# Patient Record
Sex: FEMALE | Race: WHITE
[De-identification: names, ages, dates, MRNs, and addresses within clinical notes are randomized per-mention and may not be internally consistent; named-entity substitution may affect disease eponyms.]

---

## 2020-08-10 ENCOUNTER — HOSPITAL ENCOUNTER (INPATIENT)
Dept: HOSPITAL 1 - ED | Age: 35
LOS: 2 days | Discharge: HOME | DRG: 64 | End: 2020-08-12
Attending: FAMILY MEDICINE | Admitting: FAMILY MEDICINE
Payer: COMMERCIAL

## 2020-08-10 VITALS
WEIGHT: 174 LBS | HEIGHT: 70 IN | WEIGHT: 174 LBS | BODY MASS INDEX: 24.91 KG/M2 | HEIGHT: 70 IN | BODY MASS INDEX: 24.91 KG/M2

## 2020-08-10 VITALS — SYSTOLIC BLOOD PRESSURE: 156 MMHG | DIASTOLIC BLOOD PRESSURE: 108 MMHG

## 2020-08-10 DIAGNOSIS — E87.6: ICD-10-CM

## 2020-08-10 DIAGNOSIS — Z79.899: ICD-10-CM

## 2020-08-10 DIAGNOSIS — Z83.3: ICD-10-CM

## 2020-08-10 DIAGNOSIS — E78.5: ICD-10-CM

## 2020-08-10 DIAGNOSIS — I63.9: Primary | ICD-10-CM

## 2020-08-10 DIAGNOSIS — Z79.82: ICD-10-CM

## 2020-08-10 DIAGNOSIS — I10: ICD-10-CM

## 2020-08-10 DIAGNOSIS — N17.0: ICD-10-CM

## 2020-08-10 DIAGNOSIS — I16.1: ICD-10-CM

## 2020-08-10 DIAGNOSIS — Z82.49: ICD-10-CM

## 2020-08-10 LAB
ALBUMIN SERPL-MCNC: 3.7 G/DL (ref 3.4–5)
ALP SERPL-CCNC: 75 U/L (ref 46–116)
ALT SERPL-CCNC: 50 U/L (ref 14–59)
AMPHETAMINES UR QL SCN: (no result)
AMYLASE SERPL-CCNC: 45 U/L (ref 25–115)
AST SERPL-CCNC: 26 U/L (ref 15–37)
BASOPHILS NFR BLD: 0.5 % (ref 0–2)
BILIRUB SERPL-MCNC: 0.7 MG/DL (ref 0.2–1)
BUN SERPL-MCNC: 13 MG/DL (ref 7–18)
CALCIUM SERPL-MCNC: 8.9 MG/DL (ref 8.5–10.1)
CHLORIDE SERPL-SCNC: 103 MMOL/L (ref 98–107)
CHOLEST SERPL-MCNC: 182 MG/DL (ref ?–200)
CHOLEST/HDLC SERPL: 3.2 MG/DL
CO2 SERPL-SCNC: 31.7 MMOL/L (ref 21–32)
CREAT SERPL-MCNC: 1 MG/DL (ref 0.6–1)
ERYTHROCYTE [DISTWIDTH] IN BLOOD BY AUTOMATED COUNT: 15.9 % (ref 11.5–14.5)
GFR SERPLBLD BASED ON 1.73 SQ M-ARVRAT: > 60 ML/MIN
GLUCOSE SERPL-MCNC: 94 MG/DL (ref 74–106)
HDLC SERPL-MCNC: 57 MG/DL (ref 40–60)
LIPASE SERPL-CCNC: 81 IU/L (ref 73–393)
MICROSCOPIC UR-IMP: YES
PLATELET # BLD: 307 X10^3MCL (ref 130–400)
POTASSIUM SERPL-SCNC: 3.2 MMOL/L (ref 3.5–5.1)
PROT SERPL-MCNC: 8.2 G/DL (ref 6.4–8.2)
RBC # UR STRIP.AUTO: NEGATIVE /UL
SODIUM SERPL-SCNC: 142 MMOL/L (ref 136–145)
TRIGL SERPL-MCNC: 88 MG/DL (ref ?–150)
UA SPECIFIC GRAVITY: 1.02 (ref 1–1.03)

## 2020-08-10 PROCEDURE — G0378 HOSPITAL OBSERVATION PER HR: HCPCS

## 2020-08-10 NOTE — NUR
ASSUMING CARE OF PT AT THIS TIME. PT AWAKE AND ALERT. RESP EVEN AND UNLABORED,
PT DENIES ANY PAIN AT THIS TIME. VITALS UPDATED. WAITING FOR TELE ROOM. WILL
CONT TO MONITOR.

## 2020-08-10 NOTE — NUR
CALLED AND GAVE REPORT TO BHUPENDRA MENA TO ASSUME CARE OF PT AT THIS TIME. SHE
STATES PLEASE WAIT 10 MINS TO BRING UP PT ROOM STILL BEING CLEANED.

## 2020-08-10 NOTE — NUR
RECEIVED PT FROM ER, PT ADMIT FOR CVA, PT IS A/O X4, VERBAL RESPONSIVE. NO
FACIAL DROOP NOTED. NO WEAKNESS SIDE. C/O HEADACHE 4/10, LUNG SOUND CLEAR
BILATERAL, NO COUGH, NO SOB. PT IS ON TELE 31, NSR, DENY ANY CHEST PAIN OR
DISCOMFORT, BOWEL SOUND PRESENT ALL 4 QUADRANTS, NO DISTENTION, NO TENDER.
PEDAL PULSE PRESENT BOTH FEET, NO EDEMA, IV AT LEFT AC, NO LEAKING, NO
INFITRATION. ALL ADLS ASSIST, ALL NEED MET, CALL LIGHT IN REACH, WILL CONTINUE
TO MONITOR.

## 2020-08-11 VITALS — DIASTOLIC BLOOD PRESSURE: 151 MMHG | SYSTOLIC BLOOD PRESSURE: 231 MMHG

## 2020-08-11 VITALS — SYSTOLIC BLOOD PRESSURE: 185 MMHG | DIASTOLIC BLOOD PRESSURE: 112 MMHG

## 2020-08-11 VITALS — SYSTOLIC BLOOD PRESSURE: 191 MMHG | DIASTOLIC BLOOD PRESSURE: 120 MMHG

## 2020-08-11 VITALS — DIASTOLIC BLOOD PRESSURE: 121 MMHG | SYSTOLIC BLOOD PRESSURE: 206 MMHG

## 2020-08-11 VITALS — SYSTOLIC BLOOD PRESSURE: 218 MMHG | DIASTOLIC BLOOD PRESSURE: 137 MMHG

## 2020-08-11 LAB
BASOPHILS NFR BLD: 0.5 % (ref 0–2)
BUN SERPL-MCNC: 13 MG/DL (ref 7–18)
CALCIUM SERPL-MCNC: 8.7 MG/DL (ref 8.5–10.1)
CHLORIDE SERPL-SCNC: 104 MMOL/L (ref 98–107)
CO2 SERPL-SCNC: 30.5 MMOL/L (ref 21–32)
CREAT SERPL-MCNC: 0.8 MG/DL (ref 0.6–1)
ERYTHROCYTE [DISTWIDTH] IN BLOOD BY AUTOMATED COUNT: 16.6 % (ref 11.5–14.5)
GFR SERPLBLD BASED ON 1.73 SQ M-ARVRAT: > 60 ML/MIN
GLUCOSE SERPL-MCNC: 93 MG/DL (ref 74–106)
MAGNESIUM SERPL-MCNC: 2.1 MG/DL (ref 1.8–2.4)
PHOSPHATE SERPL-MCNC: 4 MG/DL (ref 2.5–4.9)
PLATELET # BLD: 276 X10^3MCL (ref 130–400)
POTASSIUM SERPL-SCNC: 3.4 MMOL/L (ref 3.5–5.1)
SODIUM SERPL-SCNC: 142 MMOL/L (ref 136–145)

## 2020-08-11 NOTE — NUR
PATIENTS BP REMAINED ELEVATED, /137 . DR. WONG AWARE AND WANTS
TO MAINTAIN PATIENT IN PREMISSIVE HTN. PATIENT DENIES HEADACHE AT THIS TIME.
NO NEW NEURO DEFICIT NOTED AT THIS TIME. WILL CONTINUE TO MONITOR.

## 2020-08-11 NOTE — NUR
NOTIFIED DR. HANCOCK REGARDING PATIENTS ELEVATED BP, DR. LEE MADE AWARE
1ST BP /121  AND 2ND BP /116 . DR. HANCOCK STATED
SHE WILL TEXT DR. WONG.

## 2020-08-11 NOTE — NUR
DR. MULLIGAN AWARE OF PATIENTS BP; PER DR. ESE WYATT TO  HOLD OFF HYDRALAZINE.
WILL CONTINUE TO MONITOR.

## 2020-08-11 NOTE — NUR
PT.'S BLOOD PRESSURE CONTINUED TO INCREASE, 204.114, . RESIDENT MADE
AWARE. PER DR. OLSEN, WHO WAS  PRESENT WITH DR. PHILLIP, PT. HAS TO BE
TREATED AND ALLOWED PERMISSIVE HTN TO EXIST FOR UP TO 24HRS. NO MEDICATION
ORDERED, BUT PARAMETERS FOR SBP NOW FOR HYDRALYZINE, TO BE GIEN FOR SBP
GREATER THAN 200. CHARGE NURSE MADE AWARE.

## 2020-08-11 NOTE — NUR
PT. ASLEEP AT THIS TIME. EYES CLOSED. APPEARS COMFORTABLE. NSR ON MONITOR. NO
C/O CHESTPAIN OR DISCOMFORT. NO INCREASE IN WEAKNESS OR NEW NEURO DEFICITS
THUS FAR. WILL CONTINUE TO MONITOR.

## 2020-08-11 NOTE — NUR
PATIENT BP /121 (150), RECHECKED PATIENT BP: 208/116 (142). NO NEW
NEURO DEFICITS NOTED AND WEAKNESS NOTED. PATIENTS REPORTS A HEADACHE 04/10,
PATIENT HAS ASPIRIN DUE AT 0900 WILL GIVE AND CONTINUE TO MONITOR. DR. MARVIN DAMICO AT THIS TIME, WILL FOLLOW UP.

## 2020-08-11 NOTE — NUR
PATIENT BLOOD PRESSURE DECREASED /120 , AFTER HYDRALAZINE 10MG
IVP GIVEN. NO ACUTE CHANGES NOTED AT THIS TIME. PATIENT DENIES HEADACHE. ALL
NEEDS MET AT THIS TIME, WILL CONTINUE TO MONIOR.

## 2020-08-11 NOTE — NUR
RECEIVED PATIENT RESTING IN BED, NO ACUTE DISTRESS NOTED.  PATIENT AAOX4,
DENIES HEADACHE. TELE MONITOR IN PLACE, NSR NOTED. PULSES PALPABLE X4, NO
EDEMA NOTED. LUNG SOUNDS CTA, DENIES SOB. NO NEW NEURO DEFICITS OR WEAKNESS
NOTED. DENIES PAIN AT THIS TIME. IV TO LAC SALINE LOCK, CDI&PATENT. CALL LIGHT
WITHIN REACH, BED IN LOW POSITION, WILL CONTINUE TO MONITOR.

## 2020-08-11 NOTE — NUR
BLOOD PRESSURE ELEVATED THIS MORNING, 185/112, /112. PT. DENIED PAIN,
DENIED HEADACHE. NO BLURRED VISION. NO NEW NEURO DEFICIT OR WEAKNESS NOTED.
PRN HYDRALYZINE, IVP, GIVEN ORDERED. BP REPEATED, BLOOD PRESSURE 198/118.
RESIDENT, DR. PHILLIP, MADE AWARE. WILL CONTINUE TO MONITOR PT.

## 2020-08-11 NOTE — NUR
RECEIVED REPORT FROM TRINA JO. PT RESTING IN BED. AA&O X4. NO SOB ON ROOM
AIR. BREATHING EVEN AND UNALBORED. NO C/O CHEST PAIN. DENIES WEAKNESS AT THIS
TIME. TYLENOL WAS GIVEN BY PREVIOUS RN FOR HEADACHE AND HYDRALAZINE FOR HIGH
BP. WILL REASSESS. SAFETY MEASURES IN PLACE. BED IN LOWEST POSITION.
INSTRUCTED PT TO USE THE CALL LIGHT FOR ASSISTANCE. CALL LIGHT WITHIN REACH.

## 2020-08-12 VITALS — DIASTOLIC BLOOD PRESSURE: 108 MMHG | SYSTOLIC BLOOD PRESSURE: 165 MMHG

## 2020-08-12 VITALS — DIASTOLIC BLOOD PRESSURE: 97 MMHG | SYSTOLIC BLOOD PRESSURE: 151 MMHG

## 2020-08-12 VITALS — DIASTOLIC BLOOD PRESSURE: 110 MMHG | SYSTOLIC BLOOD PRESSURE: 188 MMHG

## 2020-08-12 VITALS — DIASTOLIC BLOOD PRESSURE: 136 MMHG | SYSTOLIC BLOOD PRESSURE: 200 MMHG

## 2020-08-12 LAB
BASOPHILS NFR BLD: 0.3 % (ref 0–2)
BUN SERPL-MCNC: 14 MG/DL (ref 7–18)
CALCIUM SERPL-MCNC: 9.1 MG/DL (ref 8.5–10.1)
CHLORIDE SERPL-SCNC: 104 MMOL/L (ref 98–107)
CO2 SERPL-SCNC: 29.6 MMOL/L (ref 21–32)
CREAT SERPL-MCNC: 1 MG/DL (ref 0.6–1)
ERYTHROCYTE [DISTWIDTH] IN BLOOD BY AUTOMATED COUNT: 16.6 % (ref 11.5–14.5)
GFR SERPLBLD BASED ON 1.73 SQ M-ARVRAT: > 60 ML/MIN
GLUCOSE SERPL-MCNC: 100 MG/DL (ref 74–106)
MAGNESIUM SERPL-MCNC: 2.1 MG/DL (ref 1.8–2.4)
PHOSPHATE SERPL-MCNC: 4.4 MG/DL (ref 2.5–4.9)
PLATELET # BLD: 389 X10^3MCL (ref 130–400)
POTASSIUM SERPL-SCNC: 4.3 MMOL/L (ref 3.5–5.1)
SODIUM SERPL-SCNC: 141 MMOL/L (ref 136–145)

## 2020-08-12 NOTE — NUR
BP: 200/136. PRN HYDRALAZINE IVP ADMINISTERED AS ORDERED PER EMAR. PT DENIES
HEADACHE, CHEST PAIN OR SOB. WILL CONTINUE TO MONITOR.

## 2020-08-12 NOTE — NUR
PT RESTING IN BED, AOX4, RESP E/U ON RA. DENIES HEADACHE, DIZZINESS OR
FATIGUE. NO ACUTE DISTRESS NOTED. BED IN LOWEST POSITION AND CALL LIGHT WITHIN
REACH. WILL CONTINUE TO MONITOR.

## 2020-08-12 NOTE — NUR
PT DISCHARGED. D/C PACKET, NEW RX MEDS AND F/U INSTRUCTIONS REVIEWED W/ PT, PT
VERBALIZED UNDERSTANDING OF INSTRUCTIONS. PT AOX4, RESP E/U ON RA, VS STABLE,
DENIES PAIN AT THIS TIME. IV TO LAC REMOVED, CATH INTACT, GAUZE APPLIED TO
SITE. PT AMBULATORY TO D/C OFFICE, ESCORTED BY TRINA CROCKETT W/ NO ACUTE
INCIDENCE.

## 2020-08-12 NOTE — NUR
PT RESTING COMFORTABLY. NO SOB ON ROOM AIR. NO C/O CHEST PAIN. NO DISTRESS
NOTED. DENIES HEADACHE OR WEAKNESS TO LLE. SAFETY MEASURES MAINTAINED. CALL
LIGHT WITHIN REACH. ENDORSED ACRE TO DAY SHIFT RN.

## 2020-08-12 NOTE — NUR
RECEIVED PT FROM NIGHT SHIFT NURSE. PT IN BED SLEEPING, AROUSABLE, RESP E/U ON
RA. NO ACUTE DISTRESS NOTED. ON TELE 31 SHOW[address] ST, HR: 113. SALINE LOCK TO
LAC W/ NO ERYTHEMA/EDEMA. BED IN LOWEST POSITION AND CALL LIGHT WITHIN REACH.
WILL CONTINUE TO MONITOR.

## 2020-10-07 ENCOUNTER — HOSPITAL ENCOUNTER (INPATIENT)
Dept: HOSPITAL 1 - ED | Age: 35
LOS: 7 days | Discharge: HOME | DRG: 853 | End: 2020-10-14
Attending: HOSPITALIST | Admitting: HOSPITALIST
Payer: COMMERCIAL

## 2020-10-07 VITALS
BODY MASS INDEX: 40.48 KG/M2 | HEIGHT: 68 IN | BODY MASS INDEX: 40.48 KG/M2 | HEIGHT: 68 IN | WEIGHT: 267.12 LBS | WEIGHT: 267.12 LBS

## 2020-10-07 VITALS — DIASTOLIC BLOOD PRESSURE: 78 MMHG | SYSTOLIC BLOOD PRESSURE: 124 MMHG

## 2020-10-07 VITALS — SYSTOLIC BLOOD PRESSURE: 144 MMHG | DIASTOLIC BLOOD PRESSURE: 74 MMHG

## 2020-10-07 DIAGNOSIS — A41.9: Primary | ICD-10-CM

## 2020-10-07 DIAGNOSIS — E86.0: ICD-10-CM

## 2020-10-07 DIAGNOSIS — E66.01: ICD-10-CM

## 2020-10-07 DIAGNOSIS — Z83.3: ICD-10-CM

## 2020-10-07 DIAGNOSIS — K80.42: ICD-10-CM

## 2020-10-07 DIAGNOSIS — N17.0: ICD-10-CM

## 2020-10-07 DIAGNOSIS — Z86.73: ICD-10-CM

## 2020-10-07 DIAGNOSIS — Z20.828: ICD-10-CM

## 2020-10-07 DIAGNOSIS — R10.9: ICD-10-CM

## 2020-10-07 DIAGNOSIS — Z79.82: ICD-10-CM

## 2020-10-07 DIAGNOSIS — Z79.899: ICD-10-CM

## 2020-10-07 DIAGNOSIS — Z79.891: ICD-10-CM

## 2020-10-07 DIAGNOSIS — Z82.49: ICD-10-CM

## 2020-10-07 DIAGNOSIS — I10: ICD-10-CM

## 2020-10-07 LAB
ALBUMIN SERPL-MCNC: 3.7 G/DL (ref 3.4–5)
ALP SERPL-CCNC: 105 U/L (ref 46–116)
ALT SERPL-CCNC: 118 U/L (ref 14–59)
AMPHETAMINES UR QL SCN: (no result)
AST SERPL-CCNC: 41 U/L (ref 15–37)
BASOPHILS NFR BLD: 1.3 % (ref 0–2)
BILIRUB SERPL-MCNC: 1.2 MG/DL (ref 0.2–1)
BUN SERPL-MCNC: 25 MG/DL (ref 7–18)
CALCIUM SERPL-MCNC: 9.3 MG/DL (ref 8.5–10.1)
CHLORIDE SERPL-SCNC: 99 MMOL/L (ref 98–107)
CHOLEST SERPL-MCNC: 160 MG/DL (ref ?–200)
CHOLEST/HDLC SERPL: 2.6 MG/DL
CO2 SERPL-SCNC: 27.3 MMOL/L (ref 21–32)
CREAT SERPL-MCNC: 1.3 MG/DL (ref 0.6–1)
ERYTHROCYTE [DISTWIDTH] IN BLOOD BY AUTOMATED COUNT: 17.1 % (ref 11.5–14.5)
GFR SERPLBLD BASED ON 1.73 SQ M-ARVRAT: 50 ML/MIN
GLUCOSE SERPL-MCNC: 80 MG/DL (ref 74–106)
HDLC SERPL-MCNC: 61 MG/DL (ref 40–60)
LIPASE SERPL-CCNC: 84 IU/L (ref 73–393)
MAGNESIUM SERPL-MCNC: 2.2 MG/DL (ref 1.8–2.4)
MICROSCOPIC UR-IMP: YES
PHOSPHATE SERPL-MCNC: 3.5 MG/DL (ref 2.5–4.9)
PLATELET # BLD: 334 X10^3MCL (ref 130–400)
POTASSIUM SERPL-SCNC: 3.5 MMOL/L (ref 3.5–5.1)
PROT SERPL-MCNC: 9 G/DL (ref 6.4–8.2)
RBC # UR STRIP.AUTO: NEGATIVE /UL
SODIUM SERPL-SCNC: 135 MMOL/L (ref 136–145)
TRIGL SERPL-MCNC: 118 MG/DL (ref ?–150)
UA SPECIFIC GRAVITY: 1.02 (ref 1–1.03)

## 2020-10-07 PROCEDURE — G0378 HOSPITAL OBSERVATION PER HR: HCPCS

## 2020-10-07 PROCEDURE — A9537 TC99M MEBROFENIN: HCPCS

## 2020-10-07 NOTE — NUR
ER RESIDENT AT BEDSIDE TO SEE PT. PT ARRIVES WITH EPIGASTRIC PAINS SINCE 
SUNDAY. NO GUARDING OBSERVED AT THIS TIME

## 2020-10-07 NOTE — NUR
RECEIVED PT FROM DAY SHIFT NURSE. PT A.OX4. ABLE TO MAKE NEEDS KNOWN. SPEECH
CLEAR. MED SURG. PULSES PALPABLE. NO EDEMA PRESENT. RR EVEN AND UNLABOED ON
RA. PT DENIES SOB OR DIFFICULTY BREATHING. PT VOIDS FREELY. AMBULATORY. NO C/O
PAIN OR DISCOMFORT. IV TO RFA INFUSING NS AT 100ML/HR. CALL LIGHT WITHIN
REACH. BED IN LOWEST POSITION. WILL CONTINUE TO MONITOR.

## 2020-10-07 NOTE — NUR
RECEIVED PATIENT FROM ER VIA WHEELCHAIR, AA/O X4, NO DISTRESS NOTED. C/O 6/10
EPIGASTRIC PAIN. MED-SURG, HR 73. UPDATE POC AND ANSWER ALL QUESTION. KEEP NPO
X MEDS. CALL LIGHT WITHIN. CARE ENDORSE TO RADHA MENA.

## 2020-10-08 VITALS — DIASTOLIC BLOOD PRESSURE: 87 MMHG | SYSTOLIC BLOOD PRESSURE: 156 MMHG

## 2020-10-08 VITALS — DIASTOLIC BLOOD PRESSURE: 70 MMHG | SYSTOLIC BLOOD PRESSURE: 136 MMHG

## 2020-10-08 VITALS — DIASTOLIC BLOOD PRESSURE: 74 MMHG | SYSTOLIC BLOOD PRESSURE: 137 MMHG

## 2020-10-08 LAB
ALBUMIN SERPL-MCNC: 2.8 G/DL (ref 3.4–5)
ALP SERPL-CCNC: 73 U/L (ref 46–116)
ALT SERPL-CCNC: 73 U/L (ref 14–59)
AST SERPL-CCNC: 26 U/L (ref 15–37)
BASOPHILS NFR BLD: 0.3 % (ref 0–2)
BILIRUB SERPL-MCNC: 1.1 MG/DL (ref 0.2–1)
BUN SERPL-MCNC: 31 MG/DL (ref 7–18)
CALCIUM SERPL-MCNC: 8.4 MG/DL (ref 8.5–10.1)
CHLORIDE SERPL-SCNC: 105 MMOL/L (ref 98–107)
CO2 SERPL-SCNC: 23.2 MMOL/L (ref 21–32)
CREAT SERPL-MCNC: 1.5 MG/DL (ref 0.6–1)
ERYTHROCYTE [DISTWIDTH] IN BLOOD BY AUTOMATED COUNT: 17.2 % (ref 11.5–14.5)
GFR SERPLBLD BASED ON 1.73 SQ M-ARVRAT: 43 ML/MIN
GLUCOSE SERPL-MCNC: 72 MG/DL (ref 74–106)
MAGNESIUM SERPL-MCNC: 2.1 MG/DL (ref 1.8–2.4)
PHOSPHATE SERPL-MCNC: 4.2 MG/DL (ref 2.5–4.9)
PLATELET # BLD: 271 X10^3MCL (ref 130–400)
POTASSIUM SERPL-SCNC: 3.9 MMOL/L (ref 3.5–5.1)
PROT SERPL-MCNC: 7 G/DL (ref 6.4–8.2)
SODIUM SERPL-SCNC: 138 MMOL/L (ref 136–145)

## 2020-10-08 NOTE — NUR
ROUNDING WITH OUTGOING NIGHT SHIFT RN , LONNIEA SCAN TECH HERE AT BEDSIDE AND RN
CONVERTED IV TO SALINE LOCK AND WHEELCHAIR PT TO HIDA SCAN DEPT .

## 2020-10-08 NOTE — NUR
PT SLEEPING AT THIS TIME. NO SIGNS OF ACUTE DISTRESS NOTED. RR EVEN AND
UNLABORED ON RA. NO C/O OF PAIN OR DISCOMFORT. WILL CONTINUE TO MONITOR.

## 2020-10-08 NOTE — NUR
MD PHILLIP CALLED AND INFORMED ABOUT HIDA SCAN RESULTS ACUTE CHLECYSTITIS , NO
OBSTRUCTION OF THE CYSTIC OR COMMON BILE DUCT AND PT MADE AWARE AND
ACKNOWLEDGED .

## 2020-10-08 NOTE — NUR
RECEIVED PT FROM DAY SHIFT NURSE. PT AWAKE RESTING IN BED AT THIS TIME. A/OX4.
ABLE TO MAKE NEEDS KNOWN. MED SURG. RR EVEN AND UNLABORED ON RA. VOIDS FREELY.
AMBULATORY. PT DENIES PAIN AT THIS TIME. IV TO RFA INFUSING NS AT 100ML/HR.
CALL LIGHT WITHIN REACH. BED IN LOWEST POSITION. WILL CONTINUE TO MONITOR.

## 2020-10-08 NOTE — NUR
PT SLEEPING AT THIS TIME. EASILY AROUSABLE. NO SIGNS OF ACUTE DISTRESS NOTED.
NO C/O PAIN OR DISCOMFORT AT THIS TIME. ALL NEEDS/CONCERNS ADDRESSED
THROUGHOUT THE SHIFT. WILL ENDORSE CARE TO ONCOMING SHIFT NURSE.

## 2020-10-08 NOTE — NUR
NEW IV STARTED VIA LEFT FOREARM ANGIO # 22 AND ANTIBIOTIC ZOSYN IVPB
ADMINISTERED , FORMER IV DISCONTINUED ,

## 2020-10-09 VITALS — DIASTOLIC BLOOD PRESSURE: 83 MMHG | SYSTOLIC BLOOD PRESSURE: 167 MMHG

## 2020-10-09 VITALS — DIASTOLIC BLOOD PRESSURE: 95 MMHG | SYSTOLIC BLOOD PRESSURE: 146 MMHG

## 2020-10-09 VITALS — SYSTOLIC BLOOD PRESSURE: 141 MMHG | DIASTOLIC BLOOD PRESSURE: 86 MMHG

## 2020-10-09 VITALS — DIASTOLIC BLOOD PRESSURE: 98 MMHG | SYSTOLIC BLOOD PRESSURE: 144 MMHG

## 2020-10-09 VITALS — SYSTOLIC BLOOD PRESSURE: 148 MMHG | DIASTOLIC BLOOD PRESSURE: 95 MMHG

## 2020-10-09 VITALS — DIASTOLIC BLOOD PRESSURE: 30 MMHG | SYSTOLIC BLOOD PRESSURE: 120 MMHG

## 2020-10-09 LAB
BASOPHILS NFR BLD: 0.5 % (ref 0–2)
BUN SERPL-MCNC: 19 MG/DL (ref 7–18)
CALCIUM SERPL-MCNC: 8.7 MG/DL (ref 8.5–10.1)
CHLORIDE SERPL-SCNC: 105 MMOL/L (ref 98–107)
CO2 SERPL-SCNC: 24.4 MMOL/L (ref 21–32)
CREAT SERPL-MCNC: 1 MG/DL (ref 0.6–1)
ERYTHROCYTE [DISTWIDTH] IN BLOOD BY AUTOMATED COUNT: 17.6 % (ref 11.5–14.5)
GFR SERPLBLD BASED ON 1.73 SQ M-ARVRAT: > 60 ML/MIN
GLUCOSE SERPL-MCNC: 72 MG/DL (ref 74–106)
MAGNESIUM SERPL-MCNC: 1.9 MG/DL (ref 1.8–2.4)
PHOSPHATE SERPL-MCNC: 3.1 MG/DL (ref 2.5–4.9)
PLATELET # BLD: 274 X10^3MCL (ref 130–400)
POTASSIUM SERPL-SCNC: 4 MMOL/L (ref 3.5–5.1)
SODIUM SERPL-SCNC: 139 MMOL/L (ref 136–145)

## 2020-10-09 NOTE — NUR
PATIENT RESTING IN BED, NO APPARENT DISTRESS. NO ACUTE CHANGES NOTED
THROUGHOUT SHIFT. IV SITE INFUSING WELL, CDI AND PATENT. WILL ENDORSE TO
ONCOMING NIGHT NURSE.

## 2020-10-09 NOTE — NUR
PT AWAKE RESTING IN BED AT THIS TIME. NO SIGNS OF ACUTE DISTRESS NOTED. RR
EVEN AND UNLABORED ON RA. PT DENIES PAIN AT THIS TIME. ALL NEEDS/CONCERNS
ADDRESSED THROUGHOUT THE SHIFT. WILL ENDORSE CARE TO ONCOMING SHIFT NURSE

## 2020-10-09 NOTE — NUR
*************************NURSING CO-SIGN*************************
THE DOCUMENTATION ENTERED BY THE IP HAS BEEN REVIEWED.
 
REVIEWED/CO-SIGNED BY: Chantale Strauss RN
DOCUMENTATION DONE BY: GUERRERO BLANCAS RN

## 2020-10-09 NOTE — NUR
PT LYING IN BED SUPINE WATCHING TV ON CELL PHONE.  A/OX4, CALM AND
COOPERATIVE.  RESPIRATIONS EVEN, UNLABORED, CTA, RA.  DENIES SOB, DIZZINESS,
CHEST PAIN.  BS ACTIVE.  ABD FLAT/SOFT.  TENDER TO PALPATION.  LBM 10/09,
LOOSE.  DENIES URINARY ISSUES.  PERIPHERAL PULSES STRONG.  NO EDEMA NOTED.
DENIES PAIN.  BED IN LOWEST POSITION, SIDE RAILS X 2, CALL LIGHT WITHIN REACH

## 2020-10-10 VITALS — DIASTOLIC BLOOD PRESSURE: 89 MMHG | SYSTOLIC BLOOD PRESSURE: 154 MMHG

## 2020-10-10 VITALS — SYSTOLIC BLOOD PRESSURE: 126 MMHG | DIASTOLIC BLOOD PRESSURE: 86 MMHG

## 2020-10-10 VITALS — SYSTOLIC BLOOD PRESSURE: 147 MMHG | DIASTOLIC BLOOD PRESSURE: 81 MMHG

## 2020-10-10 VITALS — SYSTOLIC BLOOD PRESSURE: 134 MMHG | DIASTOLIC BLOOD PRESSURE: 85 MMHG

## 2020-10-10 VITALS — SYSTOLIC BLOOD PRESSURE: 145 MMHG | DIASTOLIC BLOOD PRESSURE: 94 MMHG

## 2020-10-10 NOTE — NUR
PT SLEEPING SUPINE.  RESPIRATIONS UNLABORED.  NON VERBAL PAIN INDICATORS
ABSENT. NO C/O PAIN, N/V, DIARRHEA DURING SHIFT.  PT ROVIDED WITH SUPPLIES TO
CLEAN SELF SINCE NO SHOWER AVAILABLE.  IV LFA PATENT RUNNING NS@100ML/HR, NO
COMPLICATIONS TO SITE.  NO FALLS OR INJURIES SUSTAINED DURING SHFIT.  BED IN
LOWEST POSITION, SIDE RAILS X 2, CALL LIGHT WITHIN REACH

## 2020-10-10 NOTE — NUR
pt sleeping, easily woken to voice. medicated as ordered. tolerated well.
updated by rn. [email or web address]. respiratons unlabored. skin warm and dry. denies any
complaints or concerns at this time.

## 2020-10-10 NOTE — NUR
PT C/O PAIN ON THE LFA IV SITE, DC'D, MINIMAL BLEEDING, CATHETER INTACT.
INSERTED A NEW IV ON THE LEFT HAND WITH GOOD BLOOD RETURN, FLUSHING WELL, PT
TOLERATED WELL.

## 2020-10-10 NOTE — NUR
MEDICATED AS ORDERED. TOLERATED WELL. SITE CLEAR. a&OX4. RESPIRATIONS
UNLABORED. SKIN WARM AND DRY. WATCHING TV RESTING QUIETLY IN BED.

## 2020-10-10 NOTE — NUR
PT LYING IN BED SUPINE WATCHING TV ON PHONE.  RESPIRATIONS UNLABORED.  DENIES
PAIN, N/V. IV LFA PATENT, RUNNING NS@100 ML/HR, NO COMPLICATIOS TO SITE.  ALL
NEEDS MET.  BED IN LOWEST POSTION, SIDE RAILS X 2, CALL LIGHT WITHIN REACH

## 2020-10-10 NOTE — NUR
RECEIVED PT IN BED, AWAKE, RESTING COMFORTABLY. A/O X 3, ABLE TO FOLLOW
COMMANDS, ABLE TO MAKE NEEDS KNOWN, SPEECH IS CLR, NO C/O HA OR DIZZINESS.
RESP IS EVEN AND UNLABORED, LUNGS CLR BILATERALLY, SPO2 98% ON RA. RADIAL AND
PEDAL PULSES PRESENT, NO EDEMA NOTED. NO C/O CHEST PAIN AT THIS TIME. ABD
ROUND AND SOFT, REPORTS SLIGHT TENDERNESS ON THE RUQ, NO C/O NV, REPORTS LAST
BM 10/10, BROWN WATERY WITH CHUNKS. PT VOIDS FREELY, NO DISCOMFORT REPORTED.
IV SITE ON THE LFA UIS PATENT AND FLUSHING BUT PT C/O PAIN. WILL ATTEMPT TO
REINSERT TO ANOTHER LOCATION. BED TO LOWEST POSTITION, CALL LIGHT WITHIN
REACH. WILL CONT TO MONITOR FOR CHANGES IN CONDITION.

## 2020-10-11 VITALS — SYSTOLIC BLOOD PRESSURE: 138 MMHG | DIASTOLIC BLOOD PRESSURE: 94 MMHG

## 2020-10-11 VITALS — DIASTOLIC BLOOD PRESSURE: 75 MMHG | SYSTOLIC BLOOD PRESSURE: 135 MMHG

## 2020-10-11 VITALS — DIASTOLIC BLOOD PRESSURE: 91 MMHG | SYSTOLIC BLOOD PRESSURE: 142 MMHG

## 2020-10-11 VITALS — DIASTOLIC BLOOD PRESSURE: 82 MMHG | SYSTOLIC BLOOD PRESSURE: 147 MMHG

## 2020-10-11 VITALS — DIASTOLIC BLOOD PRESSURE: 76 MMHG | SYSTOLIC BLOOD PRESSURE: 128 MMHG

## 2020-10-11 NOTE — NUR
PT A/A/O X4. DENIES DIZZINESS AND HEADACHE. BREATH SOUNDS CLEAR. BREATHING
EVEN AND UNLABORED ON ROOM AIR. DENIES CHEST PAIN AND PRESSURE. BOWEL SOUNDS
ACTIVE. NO C/O N/V AND ABD PAIN THUS FAR. IV INTACT ON THE LEFT HAND INFUSING
WITH NS  ML/HR. MADE PT COMFORTABLE. PLACED CALL LIGHT WITH IN REACH.
WILL CONTINUE TO MONITOR.

## 2020-10-11 NOTE — NUR
PT AWAKE RESTING COMFORTABLY WITH EYES CLOSED, NO C/O PAIN, NO ACUTE DISTRESS
NOTED. RESP IS EVEN AND UNLABORED. WILL CONT TO MONITOR FOR CHANGES IN
CONDITION AND ENDORSE TO NEXT SHIFT NURSE.

## 2020-10-11 NOTE — NUR
PT IN BED, RESTING COMFORTABLY WITH EYES CLOSED. NO C/O PAIN, NO ACUTE
DISTRESS NOTED. RESP IS EVEN AND UNLABORED.BED TO LOWEST POSITION, CALL LIGHT
WITHIN REASY REACH, WILL CONT TO MONIOTR FOR CHANGES IN POSITION.

## 2020-10-12 VITALS — DIASTOLIC BLOOD PRESSURE: 83 MMHG | SYSTOLIC BLOOD PRESSURE: 129 MMHG

## 2020-10-12 VITALS — DIASTOLIC BLOOD PRESSURE: 115 MMHG | SYSTOLIC BLOOD PRESSURE: 178 MMHG

## 2020-10-12 VITALS — SYSTOLIC BLOOD PRESSURE: 146 MMHG | DIASTOLIC BLOOD PRESSURE: 91 MMHG

## 2020-10-12 VITALS — SYSTOLIC BLOOD PRESSURE: 164 MMHG | DIASTOLIC BLOOD PRESSURE: 99 MMHG

## 2020-10-12 LAB
BASOPHILS NFR BLD: 0 % (ref 0–2)
BUN SERPL-MCNC: 10 MG/DL (ref 7–18)
CALCIUM SERPL-MCNC: 9.4 MG/DL (ref 8.5–10.1)
CHLORIDE SERPL-SCNC: 102 MMOL/L (ref 98–107)
CO2 SERPL-SCNC: 27.9 MMOL/L (ref 21–32)
CREAT SERPL-MCNC: 1 MG/DL (ref 0.6–1)
ERYTHROCYTE [DISTWIDTH] IN BLOOD BY AUTOMATED COUNT: 16.7 % (ref 11.5–14.5)
GFR SERPLBLD BASED ON 1.73 SQ M-ARVRAT: > 60 ML/MIN
GLUCOSE SERPL-MCNC: 116 MG/DL (ref 74–106)
PLATELET # BLD: 314 X10^3MCL (ref 130–400)
POTASSIUM SERPL-SCNC: 3.9 MMOL/L (ref 3.5–5.1)
SODIUM SERPL-SCNC: 139 MMOL/L (ref 136–145)

## 2020-10-12 PROCEDURE — 0FT44ZZ RESECTION OF GALLBLADDER, PERCUTANEOUS ENDOSCOPIC APPROACH: ICD-10-PCS | Performed by: SURGERY

## 2020-10-12 NOTE — NUR
Pt received in bed awake
S/P lap barbara, lap site x3 CDI, SEBASTIÁN w/ serosanguineous drainage
Condition stable
No signs of distress
Mild pain to abdomen

## 2020-10-12 NOTE — NUR
PT QUIET AND RESTING. DENIES ABDOMINAL PAIN THUS FAR. IV INTACT AND INFUSING
AS ORDERED. MADE PT COMFORTABLE. WILL ENDORSE TO THE AM NURSE ACCORDINGLY.

## 2020-10-12 NOTE — NUR
DAY SHIFT
 
Patient received awake and alert, able to make needs known. No complaints of
respiratory distress. Patient scheduled for a lap barbara this morning. AM
lisinopril and docusate was held prior to procedure. Tolerated procedure well.
Per OR team patient had BP sustaining in 170's when she was waking up from
anesthesia - administered labetolol x1.
 
Post procedure patient was educated on importance of using call light for
assistance due to discomfort and noted to still be slightly sedated. Was able
to eat lunch and tolerated well, clear liquid. No nausea noted.
 
No complaints of pain up until this point. Patient stated she had some
abdominal disconfort but refused pain medication. Currently has a right
quadrant SEBASTIÁN drain with serosanguinous drainage. Will continue to monitor
closely.

## 2020-10-13 VITALS — SYSTOLIC BLOOD PRESSURE: 180 MMHG | DIASTOLIC BLOOD PRESSURE: 119 MMHG

## 2020-10-13 VITALS — SYSTOLIC BLOOD PRESSURE: 115 MMHG | DIASTOLIC BLOOD PRESSURE: 72 MMHG

## 2020-10-13 VITALS — SYSTOLIC BLOOD PRESSURE: 175 MMHG | DIASTOLIC BLOOD PRESSURE: 115 MMHG

## 2020-10-13 VITALS — SYSTOLIC BLOOD PRESSURE: 150 MMHG | DIASTOLIC BLOOD PRESSURE: 95 MMHG

## 2020-10-13 VITALS — DIASTOLIC BLOOD PRESSURE: 117 MMHG | SYSTOLIC BLOOD PRESSURE: 172 MMHG

## 2020-10-13 VITALS — SYSTOLIC BLOOD PRESSURE: 132 MMHG | DIASTOLIC BLOOD PRESSURE: 82 MMHG

## 2020-10-13 LAB
BASOPHILS NFR BLD: 0.3 % (ref 0–2)
BUN SERPL-MCNC: 7 MG/DL (ref 7–18)
CALCIUM SERPL-MCNC: 8.8 MG/DL (ref 8.5–10.1)
CHLORIDE SERPL-SCNC: 103 MMOL/L (ref 98–107)
CO2 SERPL-SCNC: 24.9 MMOL/L (ref 21–32)
CREAT SERPL-MCNC: 0.9 MG/DL (ref 0.6–1)
ERYTHROCYTE [DISTWIDTH] IN BLOOD BY AUTOMATED COUNT: 16.4 % (ref 11.5–14.5)
GFR SERPLBLD BASED ON 1.73 SQ M-ARVRAT: > 60 ML/MIN
GLUCOSE SERPL-MCNC: 146 MG/DL (ref 74–106)
MAGNESIUM SERPL-MCNC: 1.5 MG/DL (ref 1.8–2.4)
PHOSPHATE SERPL-MCNC: 3.8 MG/DL (ref 2.5–4.9)
PLATELET # BLD: 314 X10^3MCL (ref 130–400)
POTASSIUM SERPL-SCNC: 3.8 MMOL/L (ref 3.5–5.1)
SODIUM SERPL-SCNC: 139 MMOL/L (ref 136–145)

## 2020-10-13 NOTE — NUR
DR CARNEY PAGED TO REPORT MAG 1.5- 2 GRAMS ORDERED AND REPLACED RUNNING AT THIS
TIME. WILL INFORM NOC TO DC ONCE IT IS FINISHED. PATIENT DIET ADVANCED TO
REGULAR IN AM, PATIENT AWARE TO TRY AND EAT. NO S/S OF DISTRESS NOTED AT END
OF SHIFT.

## 2020-10-13 NOTE — NUR
Initial Nutrition Assessment:  JULISA GILLIS 35F MR
 
Dx: Impacted gallbladder stone
PMHx: HTN, s/p CVA in August
PSHx: none
Labs: (10/13) BG 146H, MG 1.5L, WBC 11.4H, NEUT% 84.8H, LYMPH% 9.2L, A1C 6.2
          (10/08) ALB 2.8L, BILI 1.1H, ALT 73H, ALK 73H
Meds: lisinopril, Colace, Apresoline, Zosyn, Sodium CHL, Norco, Morphine,
Lipitor
Diet: Clear liquid
PO intake since admission: (10/09) Constant Carb Diabetic: B: 100%, L: 100%,
D: 100%, (10/10) B: 100%, L: 95%, D: 100%, (10/11) B: 100%, L: 100%, D: 100%,
(10/12) NPO, (10/13) Clear liquid: B: 80%, Average 97.5% x 10 meals
Ht: 172.7cm/68 in Wt:121.16kg/267.2 lb BMI: 40.6 kg/m2  Bed scale: 121 kg/268
lbs
IBW: 67 kg/148 lbs %IBW: 180% ABW: 81kg/178 lbs UBW: 114 kg/250 lbs
Age: 36y/o
Food Allergies: none per pt
Edema: no noted
Last BM: 10/12 per pt
Skin: LAP sites x 3, RUQ SEBASTIÁN x 1, B/L upper w/bruises, R wrist bruise
Karel: 20
 
Per H and P (10/07)
Patient is a 34 yo female with a hx of HTN that presents to the ED with
complaints of abdominal pain that is localized to the RUQ. Pain started Monday
after she had a fat and heavy dinner. The pain is worse with movement.  There
are no alleviating factors. Pain is sharp and does not radiate. Pain is rated
at 6/10 and constant in nature. Severity of the pain has improved since onset.
Patient denies fever, nausea, vomiting, diarrhea, melena, and shortness of
breath. Per EDs notes, patient went to her PCP and was given omeprazole but
was ineffective at resolving her pain. The patient is ambulatory at the
baseline. Pt was admitted with dx: Sepsis likely 2/2 possible cholecystitis vs
choledocholithiasis, Hx of HTN, Vasomotor nephropathy 2/2 dehydration,
Transaminases likely 2/2 cholecystitis vs choledocholithiasis, Morbid Obesity,
DVT PPx
 
RD Note (10/13):
Pt was seen lying in bed, she was awake, verbally responsive and oriented in
person, time, and place. Per pt, she reported good appetite and tolerating her
current diet (previously CCHO diet). Pt stated she does not have any chewing
or swallowing difficulties. Pt denied any recent weight changes and reported
usual body weigh 250 lbs. Additionally, her food tray was observed on the bed
site table with clear liquid diet, and pt stated she will start eating. Pt was
in a CCHO diet until 10/11; then she was on NPO d/t lap barbara done yesterday,
followed by clear liquid diet.
 
Problem with:  N/V/D/C: none per pt
Problems with: Chewing:  Swallowing: none per pt
Current appetite: good per pt
Recent wt change: none per pt  %wt change: n/a
Height: 70 in per pt
Vitamin/Supplement use: fish oil everyday
Special diet at home: none per pt
Physical activity: walks 20-30 minutes 7 times/week
Nutrition education given (specify specific nutrition education and handout
given): Explained the importance of decreasing fat intake in her diet;
including fried foods. Explained how fatty foods can increase gallbladder
disease symptoms. Sample menus were provided as examples. Written education
"Gallbladder Nutrition Therapy" and "Fat-Restricted Nutrition Therapy" from
[facility] were provided to pt and she verbalized understanding.
 
Food-drug interactions? Education given? n/a
 
Estimated Nutritional Needs Based on adjusted body weight (81 kg)
Energy: 1782 - 2025 kcal/day (22 -25 kcal/kg for weight reduction)
Protein: 97 - 122 g/day (1.2 - 1.5 g/kg sepsis and weight reduction)
Fluid: 1782 - 2025 mL/day (1 mL/kcal)
 
Nutrition Diagnosis:
1. Excessive calories intake r/t lack of nutrition related knowledge a/e/b
pt's history of consuming high fat and heavy meals and BMI over 40
 
Intervention
1. Continue with clear liquid diet until pt receives medical approve to
advance to solid foods
2. Recommend continue CCHO, 60gms/Na2 gm diet once the pt is able to receive
solid foods
 
Monitor/Evaluate
Goal: PO intake at least 75% of estimated needs
Monitor: PO intake, Labs, GI function
F/U in 3-5 days as moderate risk 10/16-18

## 2020-10-13 NOTE — NUR
Pt slept overnight, condition stable
All due care rendered, no signs of distress noted
S/P lap barbara, dressing to abdomen CDI, SEBASTIÁN w/ serosanguineous drainage
Medicated w/ Norco x2 and IV Morphine x1, pain better controlled this am
Elevated BP last night Dr. Dozier made aware, ordered PRN PO Hydralazine, given
but BP still elevated this am, Dr. Dozier notified again, adjusted dose of
hydralazine
Ambulated to restroom, voided
Belching but not passing gas

## 2020-10-13 NOTE — NUR
1. Continue with clear liquid diet until pt receives medical approve to
advance to solid foods.
2. Recommend continue CCHO, 60gms/Na2 gm diet once the pt is able to receive
solid foods.

## 2020-10-13 NOTE — NUR
Pt received in bed, awake
Condition stable
No signs of distress noted
Complaining of mild abdominal pain

## 2020-10-13 NOTE — NUR
PATIENT IS A 35 YEAR OLD FEMALE THAT CAME INTO [place] FOR EPIGASTRIC PAIN THAT
WAS PRESENT IN THE RUQ FOR THREE DAYS. IMPACTED GALLBLADDER STONES FORCED A
LAP BECKY 10/12. UA WAS NEGATIVE. CLEAR LUNG SOUNDS. RUQ SEBASTIÁN DRAIN PRESENT WITH
55CC OUTPUT PER NOC SHIFT. NS RUNNING AT 100CC/HR THOUGH LH 20G. AMBULATORY.
ALERT AND ORIENTED X 4 ABLE TO VERBLAIZE NEEDS.

## 2020-10-14 VITALS — SYSTOLIC BLOOD PRESSURE: 115 MMHG | DIASTOLIC BLOOD PRESSURE: 83 MMHG

## 2020-10-14 VITALS — DIASTOLIC BLOOD PRESSURE: 96 MMHG | SYSTOLIC BLOOD PRESSURE: 142 MMHG

## 2020-10-14 VITALS — SYSTOLIC BLOOD PRESSURE: 142 MMHG | DIASTOLIC BLOOD PRESSURE: 94 MMHG

## 2020-10-14 VITALS — SYSTOLIC BLOOD PRESSURE: 142 MMHG | DIASTOLIC BLOOD PRESSURE: 96 MMHG

## 2020-10-14 LAB
BASOPHILS NFR BLD: 0.3 % (ref 0–2)
BUN SERPL-MCNC: 8 MG/DL (ref 7–18)
CALCIUM SERPL-MCNC: 9.1 MG/DL (ref 8.5–10.1)
CHLORIDE SERPL-SCNC: 102 MMOL/L (ref 98–107)
CO2 SERPL-SCNC: 28.1 MMOL/L (ref 21–32)
CREAT SERPL-MCNC: 0.9 MG/DL (ref 0.6–1)
ERYTHROCYTE [DISTWIDTH] IN BLOOD BY AUTOMATED COUNT: 16.5 % (ref 11.5–14.5)
GFR SERPLBLD BASED ON 1.73 SQ M-ARVRAT: > 60 ML/MIN
GLUCOSE SERPL-MCNC: 96 MG/DL (ref 74–106)
PLATELET # BLD: 351 X10^3MCL (ref 130–400)
POTASSIUM SERPL-SCNC: 4.2 MMOL/L (ref 3.5–5.1)
SODIUM SERPL-SCNC: 139 MMOL/L (ref 136–145)

## 2020-10-14 NOTE — NUR
DAY SHIFT
 
Patient received awake and alert, able to make needs known. No complaints of
pain or respiratory distress. Denies nausea/ vomitting. IV is patent and
flushing well. Resting comfortably in bed. Educated on calling for assistance
for ambulation and to go to the bathroom. Patient understands her own limits.
SEBASTIÁN drain in place, draining serosanguinous fluid
 
Will continue to monitor closely.

## 2020-10-14 NOTE — NUR
Pt slept overnight, condition stable
All due care rendered, no signs of distress noted
Ambulated to restroom, voiding
S/P lap barbara, lap site CDI, SEBASTIÁN w/ 15 cc serosanguineous output this am
Abdominal pain this shift, medicated w/ Norco x1, reported good relief
Pain 4/10 this am, offered pain med but patient says pain tolerable
Belching, not passing gas yet, no BM yet
On Zosyn, afebrile, vitals stable